# Patient Record
Sex: FEMALE | Race: BLACK OR AFRICAN AMERICAN
[De-identification: names, ages, dates, MRNs, and addresses within clinical notes are randomized per-mention and may not be internally consistent; named-entity substitution may affect disease eponyms.]

---

## 2018-12-30 ENCOUNTER — HOSPITAL ENCOUNTER (EMERGENCY)
Dept: HOSPITAL 35 - ER | Age: 23
Discharge: HOME | End: 2018-12-30
Payer: COMMERCIAL

## 2018-12-30 VITALS — BODY MASS INDEX: 19.83 KG/M2 | WEIGHT: 105.01 LBS | HEIGHT: 61 IN

## 2018-12-30 VITALS — DIASTOLIC BLOOD PRESSURE: 75 MMHG | SYSTOLIC BLOOD PRESSURE: 123 MMHG

## 2018-12-30 DIAGNOSIS — Z98.890: ICD-10-CM

## 2018-12-30 DIAGNOSIS — F17.210: ICD-10-CM

## 2018-12-30 DIAGNOSIS — R11.10: ICD-10-CM

## 2018-12-30 DIAGNOSIS — K59.00: Primary | ICD-10-CM

## 2018-12-30 LAB
ALBUMIN SERPL-MCNC: 3.1 G/DL (ref 3.4–5)
ALT SERPL-CCNC: 13 U/L (ref 30–65)
ANION GAP SERPL CALC-SCNC: 7 MMOL/L (ref 7–16)
AST SERPL-CCNC: 13 U/L (ref 15–37)
BASOPHILS NFR BLD AUTO: 0.1 % (ref 0–2)
BILIRUB SERPL-MCNC: 0.7 MG/DL
BILIRUB UR-MCNC: NEGATIVE MG/DL
BUN SERPL-MCNC: 11 MG/DL (ref 7–18)
CALCIUM SERPL-MCNC: 9.5 MG/DL (ref 8.5–10.1)
CHLORIDE SERPL-SCNC: 100 MMOL/L (ref 98–107)
CO2 SERPL-SCNC: 29 MMOL/L (ref 21–32)
COLOR UR: YELLOW
CREAT SERPL-MCNC: 0.8 MG/DL (ref 0.6–1)
EOSINOPHIL NFR BLD: 0.1 % (ref 0–3)
ERYTHROCYTE [DISTWIDTH] IN BLOOD BY AUTOMATED COUNT: 14.3 % (ref 10.5–14.5)
GLUCOSE SERPL-MCNC: 93 MG/DL (ref 74–106)
GRANULOCYTES NFR BLD MANUAL: 90.8 % (ref 36–66)
HCT VFR BLD CALC: 39 % (ref 37–47)
HGB BLD-MCNC: 12.8 GM/DL (ref 12–15)
KETONES UR STRIP-MCNC: (no result) MG/DL
LIPASE: 49 U/L (ref 73–393)
LYMPHOCYTES NFR BLD AUTO: 3.9 % (ref 24–44)
MCH RBC QN AUTO: 28.8 PG (ref 26–34)
MCHC RBC AUTO-ENTMCNC: 32.7 G/DL (ref 28–37)
MCV RBC: 87.9 FL (ref 80–100)
MONOCYTES NFR BLD: 5.1 % (ref 1–8)
NEUTROPHILS # BLD: 14.4 THOU/UL (ref 1.4–8.2)
PLATELET # BLD: 238 THOU/UL (ref 150–400)
POTASSIUM SERPL-SCNC: 3.5 MMOL/L (ref 3.5–5.1)
PROT SERPL-MCNC: 7.9 G/DL (ref 6.4–8.2)
RBC # BLD AUTO: 4.44 MIL/UL (ref 4.2–5)
RBC # UR STRIP: NEGATIVE /UL
SODIUM SERPL-SCNC: 136 MMOL/L (ref 136–145)
SP GR UR STRIP: >= 1.03 (ref 1–1.03)
URINE CLARITY: (no result)
URINE GLUCOSE-RANDOM*: NEGATIVE
URINE LEUKOCYTES-REFLEX: NEGATIVE
URINE NITRITE-REFLEX: NEGATIVE
URINE PROTEIN (DIPSTICK): (no result)
UROBILINOGEN UR STRIP-ACNC: 2 E.U./DL (ref 0.2–1)
WBC # BLD AUTO: 15.9 THOU/UL (ref 4–11)

## 2019-01-04 ENCOUNTER — HOSPITAL ENCOUNTER (INPATIENT)
Dept: HOSPITAL 35 - ER | Age: 24
LOS: 4 days | Discharge: HOME | DRG: 758 | End: 2019-01-08
Attending: INTERNAL MEDICINE | Admitting: INTERNAL MEDICINE
Payer: COMMERCIAL

## 2019-01-04 VITALS — SYSTOLIC BLOOD PRESSURE: 113 MMHG | DIASTOLIC BLOOD PRESSURE: 64 MMHG

## 2019-01-04 VITALS — SYSTOLIC BLOOD PRESSURE: 122 MMHG | DIASTOLIC BLOOD PRESSURE: 84 MMHG

## 2019-01-04 VITALS — SYSTOLIC BLOOD PRESSURE: 117 MMHG | DIASTOLIC BLOOD PRESSURE: 79 MMHG

## 2019-01-04 VITALS — DIASTOLIC BLOOD PRESSURE: 72 MMHG | SYSTOLIC BLOOD PRESSURE: 113 MMHG

## 2019-01-04 VITALS — WEIGHT: 110 LBS | HEIGHT: 60.98 IN | BODY MASS INDEX: 20.77 KG/M2

## 2019-01-04 DIAGNOSIS — F17.210: ICD-10-CM

## 2019-01-04 DIAGNOSIS — K52.9: ICD-10-CM

## 2019-01-04 DIAGNOSIS — Z98.891: ICD-10-CM

## 2019-01-04 DIAGNOSIS — F12.90: ICD-10-CM

## 2019-01-04 DIAGNOSIS — K58.0: ICD-10-CM

## 2019-01-04 DIAGNOSIS — K56.600: ICD-10-CM

## 2019-01-04 DIAGNOSIS — D64.9: ICD-10-CM

## 2019-01-04 DIAGNOSIS — Z79.899: ICD-10-CM

## 2019-01-04 DIAGNOSIS — A56.09: Primary | ICD-10-CM

## 2019-01-04 DIAGNOSIS — N94.6: ICD-10-CM

## 2019-01-04 DIAGNOSIS — N71.9: ICD-10-CM

## 2019-01-04 DIAGNOSIS — E87.6: ICD-10-CM

## 2019-01-04 LAB
ALBUMIN SERPL-MCNC: 2.7 G/DL (ref 3.4–5)
ALT SERPL-CCNC: 11 U/L (ref 30–65)
ANION GAP SERPL CALC-SCNC: 10 MMOL/L (ref 7–16)
AST SERPL-CCNC: 14 U/L (ref 15–37)
BASOPHILS NFR BLD AUTO: 0.2 % (ref 0–2)
BILIRUB SERPL-MCNC: 0.6 MG/DL
BILIRUB UR-MCNC: (no result) MG/DL
BUN SERPL-MCNC: 7 MG/DL (ref 7–18)
CALCIUM SERPL-MCNC: 9.9 MG/DL (ref 8.5–10.1)
CHLORIDE SERPL-SCNC: 95 MMOL/L (ref 98–107)
CO2 SERPL-SCNC: 32 MMOL/L (ref 21–32)
COLOR UR: (no result)
CREAT SERPL-MCNC: 0.8 MG/DL (ref 0.6–1)
EOSINOPHIL NFR BLD: 0.5 % (ref 0–3)
ERYTHROCYTE [DISTWIDTH] IN BLOOD BY AUTOMATED COUNT: 14.3 % (ref 10.5–14.5)
GLUCOSE SERPL-MCNC: 85 MG/DL (ref 74–106)
GRANULOCYTES NFR BLD MANUAL: 88.2 % (ref 36–66)
HCT VFR BLD CALC: 39.2 % (ref 37–47)
HGB BLD-MCNC: 13.4 GM/DL (ref 12–15)
KETONES UR STRIP-MCNC: (no result) MG/DL
LIPASE: 53 U/L (ref 73–393)
LYMPHOCYTES NFR BLD AUTO: 5.5 % (ref 24–44)
MCH RBC QN AUTO: 29.1 PG (ref 26–34)
MCHC RBC AUTO-ENTMCNC: 34 G/DL (ref 28–37)
MCV RBC: 85.6 FL (ref 80–100)
MONOCYTES NFR BLD: 5.6 % (ref 1–8)
MUCUS: (no result) STRN/LPF
NEUTROPHILS # BLD: 13.7 THOU/UL (ref 1.4–8.2)
PLATELET # BLD: 393 THOU/UL (ref 150–400)
POTASSIUM SERPL-SCNC: 3.1 MMOL/L (ref 3.5–5.1)
PROT SERPL-MCNC: 8.6 G/DL (ref 6.4–8.2)
RBC # BLD AUTO: 4.59 MIL/UL (ref 4.2–5)
RBC # UR STRIP: NEGATIVE /UL
SODIUM SERPL-SCNC: 137 MMOL/L (ref 136–145)
SP GR UR STRIP: 1.02 (ref 1–1.03)
SQUAMOUS: (no result) /LPF (ref 0–3)
URINE CLARITY: CLEAR
URINE GLUCOSE-RANDOM*: NEGATIVE
URINE LEUKOCYTES-REFLEX: NEGATIVE
URINE NITRITE-REFLEX: NEGATIVE
URINE PROTEIN (DIPSTICK): (no result)
URINE WBC-REFLEX: (no result) /HPF (ref 0–5)
UROBILINOGEN UR STRIP-ACNC: 1 E.U./DL (ref 0.2–1)
WBC # BLD AUTO: 15.5 THOU/UL (ref 4–11)

## 2019-01-04 PROCEDURE — 10084: CPT

## 2019-01-04 NOTE — HC
[facility]
[address] [place]   [address]                     CONSULTATION                  
_______________________________________________________________________________
 
Name:       SRI FERNANDEZ                Room #:         422-P       ADM IN  
M.R.#:      [id number]                       Account #:      [id number]  
Admission:  19    Attend Phys:    Elizabeth Resendiz MD        
Discharge:              Date of Birth:  [birth year]  
                                                          Report #: [id number]
                                                                    [id number]   
_______________________________________________________________________________
THIS REPORT FOR:   //name//                          
 
CC: JESSI physician/PCP
    Elizabeth Resendiz
 
DATE OF SERVICE:  2019
 
 
REASON FOR CONSULTATION:  Evaluate enteritis and chlamydia cervicitis.
 
HISTORY OF PRESENT ILLNESS:  The patient is a 23-year-old with no previous
medical history, who presents with approximately 2-week history of abdominal
pain located in her mid to lower abdomen.  This has been associated with initial
constipation.  Also associated with nausea and vomiting.  She has had
temperature up to 102 degrees.  There has been no diarrhea.  She had one day of
which she considers abnormal stool with possible blood associated.  No history
of rectal bleeding or passing mucus.  She lives with friends and her daughter,
none of which have been ill.  No travel.  Does not work outside the home. 
Admitted on 2019 and placed on ciprofloxacin and metronidazole.  Since
then, she has improved with less abdominal pain.  The nausea and vomiting have
resolved.  Fever has resolved.  She has had a bowel movement, but overall
remains constipated.
 
She does have a history of dysmenorrhea.  She delivered her child in  by
.  Her menses have been irregular.  She has been monogamous over the
last 4 months.  No prior history of STDs.  Reports HIV negative previously.  She
has had fairly persistent vaginal bleeding for the last 3 weeks.  She did have a
hormonal implant for birth control, which has  as of 2017.
 
ALLERGIES:  None known.
 
MEDICATIONS:  As noted on her MAR including ciprofloxacin, metronidazole and now
doxycycline, which was added today.
 
PAST MEDICAL HISTORY:  Congenital hearing loss, right ear.
 
FAMILY HISTORY:  Inflammatory bowel disease.
 
SOCIAL HISTORY:  Smoker of cigarettes, occasional marijuana.  No IV drug use. 
Minimal alcohol use.
 
REVIEW OF SYSTEMS:  Denies any headache, cough, sputum, chest pain,
palpitations, dysuria, rash, adenopathy, neurologic complaints, psychiatric
complaints, hematologic issues.
 
PHYSICAL EXAMINATION:
 
 
 
[facility]
[address] [place]   [address]                     CONSULTATION                  
_______________________________________________________________________________
 
Name:       SRI FERNANDEZ                Room #:         422-P       [facility] [place]  
M.R.#:      [id number]                       Account #:      [id number]  
Admission:  19    Attend Phys:    Elizabeth Resendiz MD        
Discharge:              Date of Birth:  [birth year]  
                                                          Report #: [id number]
                                                                    [id number]   
_______________________________________________________________________________
VITAL SIGNS:  Afebrile and hemodynamically stable.  Maximum temperature last 48
hours is 100.5 degrees.
SKIN:  Unremarkable other than multiple tattoos.
NECK:  No adenopathy palpable.
HEENT:  Eyes:  No icterus or conjunctivitis.  Mouth without lesion.  Several
piercings.
LUNGS:  Clear.
HEART:  Regular, without murmur.
ABDOMEN:  Soft with mild tenderness in the mid-to-lower abdomen.  No appreciable
mass or hepatosplenomegaly.
GENITAL AND RECTAL:  Not done as previously performed by Gynecology today.
EXTREMITIES:  Without lesion, swelling, cyanosis or clubbing.
NEUROLOGIC:  Cranial nerves intact.  Strength in the upper and lower extremities
normal.  Sensation normal.  Deep tendon reflexes normal.
PSYCHIATRIC:  Mood normal.
 
LABORATORY STUDIES:  Sodium 139, potassium 3.4, bicarbonate 31, creatinine 0.7. 
Lipase 53.  Liver function test normal.  Initial white count was 15,000, now
6.6.  Hemoglobin 10.3, platelet count 353,000.  Urinalysis:  2+ protein, 1+
ketones.  Chlamydia screen positive, GC negative, Trichomonas negative.  CT of
the abdomen and pelvis, thickening of the small bowel, several loops in the mid
abdomen.  Ultrasound of the pelvis negative.
 
IMPRESSION:  A 23-year-old with chlamydia cervicitis.  No evidence of pelvic
inflammatory disease in that her CT scan and ultrasound were negative for
abscess or uterine thickening and on examination by Gynecology, she had very
mild cervical motion tenderness.  Abdominal pain is concerning for inflammatory
bowel disease.  Still could be acute infectious process.  No evidence of trauma.
 Seems relatively young for malignancy.  Doubt vasculitis or ischemia.  Meckel's
diverticulum would be possible.  No evidence of appendicitis.
 
RECOMMENDATIONS:  We will continue antibiotic coverage with ciprofloxacin and
metronidazole for 10 days.  I agree with the addition of doxycycline for 7 days.
 We will switch to enteral route for her antibiotics and see if she can tolerate
this prior to discharge.  Small bowel follow through seems most appropriate next
test followed by colonoscopy.
 
 
 
 
 
 
 
 
                         
   By:                               
                   
D: 19 2226                           _____________________________________
T: 19 0202                           Yfn Adam MD              /nt

## 2019-01-05 VITALS — DIASTOLIC BLOOD PRESSURE: 78 MMHG | SYSTOLIC BLOOD PRESSURE: 122 MMHG

## 2019-01-05 VITALS — SYSTOLIC BLOOD PRESSURE: 115 MMHG | DIASTOLIC BLOOD PRESSURE: 70 MMHG

## 2019-01-05 VITALS — DIASTOLIC BLOOD PRESSURE: 74 MMHG | SYSTOLIC BLOOD PRESSURE: 119 MMHG

## 2019-01-05 VITALS — SYSTOLIC BLOOD PRESSURE: 127 MMHG | DIASTOLIC BLOOD PRESSURE: 77 MMHG

## 2019-01-06 VITALS — SYSTOLIC BLOOD PRESSURE: 129 MMHG | DIASTOLIC BLOOD PRESSURE: 74 MMHG

## 2019-01-06 VITALS — SYSTOLIC BLOOD PRESSURE: 149 MMHG | DIASTOLIC BLOOD PRESSURE: 84 MMHG

## 2019-01-06 VITALS — SYSTOLIC BLOOD PRESSURE: 135 MMHG | DIASTOLIC BLOOD PRESSURE: 86 MMHG

## 2019-01-06 LAB
ANION GAP SERPL CALC-SCNC: 6 MMOL/L (ref 7–16)
BUN SERPL-MCNC: 2 MG/DL (ref 7–18)
CALCIUM SERPL-MCNC: 8.6 MG/DL (ref 8.5–10.1)
CHLORIDE SERPL-SCNC: 100 MMOL/L (ref 98–107)
CO2 SERPL-SCNC: 31 MMOL/L (ref 21–32)
CREAT SERPL-MCNC: 0.7 MG/DL (ref 0.6–1)
ERYTHROCYTE [DISTWIDTH] IN BLOOD BY AUTOMATED COUNT: 14.4 % (ref 10.5–14.5)
GLUCOSE SERPL-MCNC: 88 MG/DL (ref 74–106)
HCT VFR BLD CALC: 31 % (ref 37–47)
HGB BLD-MCNC: 10.5 GM/DL (ref 12–15)
MCH RBC QN AUTO: 29.1 PG (ref 26–34)
MCHC RBC AUTO-ENTMCNC: 33.7 G/DL (ref 28–37)
MCV RBC: 86.4 FL (ref 80–100)
PLATELET # BLD: 350 THOU/UL (ref 150–400)
POTASSIUM SERPL-SCNC: 3.4 MMOL/L (ref 3.5–5.1)
RBC # BLD AUTO: 3.59 MIL/UL (ref 4.2–5)
SODIUM SERPL-SCNC: 137 MMOL/L (ref 136–145)
WBC # BLD AUTO: 8.7 THOU/UL (ref 4–11)

## 2019-01-06 NOTE — HC
[facility]
[address]
[place]   [address]                     CONSULTATION                  
_______________________________________________________________________________
 
Name:       SRI FERNANDEZ                Room #:         422-P       ADM [place]  
M.R.#:      [id number]                       Account #:      [id number]  
Admission:  19    Attend Phys:    Paul Markham MD    
Discharge:              Date of Birth:  [birth year]  
                                                          Report #: [id number]
                                                                    [id number]   
_______________________________________________________________________________
THIS REPORT FOR:   //name//                          
 
CC: Paul Markham
    [facility] physician/PCP
 
HISTORY OF PRESENT ILLNESS:  The patient is a 23-year-old 
female I have been asked to see for further evaluation of her GI symptoms, which
include abdominal pain, abdominal distention and diarrhea over the course of the
last 2 weeks, but worse over the last 1 week.  She denies hematochezia.  She has
had nausea and vomiting and presents with dilated small bowel loops.  She denies
significant fever or muscle aches, myalgias or general fatigue.
 
PAST MEDICAL HISTORY:  Her medical history is well detailed in her old notes but
includes no significant chronic medical problems.
 
PAST SURGICAL HISTORY:  She has had a  in the past.
 
FAMILY HISTORY:  Negative for inflammatory bowel disease or colon cancer.
 
ALLERGIES:  She is allergic to no medications.
 
MEDICATIONS:  No medications on presentation.
 
SOCIAL HISTORY:  She does smoke marijuana and cigarettes and drinks
occasionally.
 
REVIEW OF SYSTEMS:  Negative for weight loss, weakness or fatigue.  She denies
head, eyes, ears, nose or throat complaints.  She denies chest pain, chest
palpitation, chest pressure, cough, shortness of breath, wheezing,
genitourinary, musculoskeletal or neuropsychiatric complaints beyond that
mentioned above.
 
PHYSICAL EXAMINATION:
GENERAL:  The patient is afebrile.
VITAL SIGNS:  Stable.
HEENT:  Nonicteric.
NECK:  No JVD, thyromegaly or bruits.
CARDIOVASCULAR:  Regular.
LUNGS:  Clear.
ABDOMEN:  Soft, mildly distended, tender throughout without evidence of rebound.
 No stigmata of chronic liver disease.  No abnormal masses or bruits.
EXTREMITIES:  No clubbing, cyanosis or edema.
NEUROLOGIC:  Deferred.
RECTAL:  Deferred.
 
PERTINENT LABORATORY DATA:  White count 15.5 with a left shift.  Her sed rate
 
 
 
[facility]
[address] [place]   [address]                     CONSULTATION                  
_______________________________________________________________________________
 
Name:       ABHISHEK FERNANDEZLEY                Room #:         422-P       ADM [place]  
M.R.#:      [id number]                       Account #:      [id number]  
Admission:  19    Attend Phys:    Paul Markham MD    
Discharge:              Date of Birth:  [birth year]  
                                                          Report #: [id number]
                                                                    [id number]   
_______________________________________________________________________________
was 121.  Chemistry:  Potassium 3.1, chloride 95, venous bicarbonate 32.  ALT,
AST, lipase normal.  CT reveals proximal small bowel dilation and possible
colonic dilation.  There is apparent wall thickening of the bowel and possible
narrowed thick walled small bowel in the mid pelvis.  Differential included
inflammatory bowel disease versus enteritis.  She had a pelvic transvaginal
ultrasound, which revealed retroverted uterus and otherwise not abnormal.
 
IMPRESSION AND PLAN:  In summary, the patient has evidence of enteritis.  The
differential between infectious enteritis and inflammatory enteritis secondary
to Crohn's disease is often difficult to make.  She does have a sedimentation
rate significantly elevated.  Her clinical presentation is more consistent with
a more acute process, which would suggest a viral or bacterial enteritis.  She
has never had GI workup in the past.  At this point, I would facilitate
treatment with antibiotics as we are doing.  In addition, a colonoscopy may be
recommended at some point based on her clinical course.  IV fluids and relative
bowel rest is recommended as well.  We will follow concurrently.
 
Thanks again for allowing me to participate in the care of this woman.
 
 
 
 
 
 
 
 
 
 
 
 
 
 
 
 
 
 
 
 
 
 
 
 
 
 
  <ELECTRONICALLY SIGNED>
   By: Jorge Francis MD           
  19     1616
D: 19 1404                           _____________________________________
T: 19 1520                           Jorge Francis MD             /nt

## 2019-01-07 VITALS — DIASTOLIC BLOOD PRESSURE: 82 MMHG | SYSTOLIC BLOOD PRESSURE: 123 MMHG

## 2019-01-07 VITALS — DIASTOLIC BLOOD PRESSURE: 74 MMHG | SYSTOLIC BLOOD PRESSURE: 122 MMHG

## 2019-01-07 VITALS — DIASTOLIC BLOOD PRESSURE: 69 MMHG | SYSTOLIC BLOOD PRESSURE: 111 MMHG

## 2019-01-07 VITALS — SYSTOLIC BLOOD PRESSURE: 123 MMHG | DIASTOLIC BLOOD PRESSURE: 90 MMHG

## 2019-01-07 LAB
ANION GAP SERPL CALC-SCNC: 6 MMOL/L (ref 7–16)
BUN SERPL-MCNC: 3 MG/DL (ref 7–18)
CALCIUM SERPL-MCNC: 8.3 MG/DL (ref 8.5–10.1)
CHLORIDE SERPL-SCNC: 102 MMOL/L (ref 98–107)
CO2 SERPL-SCNC: 31 MMOL/L (ref 21–32)
CREAT SERPL-MCNC: 0.7 MG/DL (ref 0.6–1)
ERYTHROCYTE [DISTWIDTH] IN BLOOD BY AUTOMATED COUNT: 14.4 % (ref 10.5–14.5)
GLUCOSE SERPL-MCNC: 97 MG/DL (ref 74–106)
HCT VFR BLD CALC: 31.5 % (ref 37–47)
HGB BLD-MCNC: 10.3 GM/DL (ref 12–15)
MCH RBC QN AUTO: 28.4 PG (ref 26–34)
MCHC RBC AUTO-ENTMCNC: 32.7 G/DL (ref 28–37)
MCV RBC: 86.8 FL (ref 80–100)
PLATELET # BLD: 353 THOU/UL (ref 150–400)
POTASSIUM SERPL-SCNC: 3.4 MMOL/L (ref 3.5–5.1)
RBC # BLD AUTO: 3.63 MIL/UL (ref 4.2–5)
SODIUM SERPL-SCNC: 139 MMOL/L (ref 136–145)
WBC # BLD AUTO: 6.6 THOU/UL (ref 4–11)

## 2019-01-08 VITALS — DIASTOLIC BLOOD PRESSURE: 72 MMHG | SYSTOLIC BLOOD PRESSURE: 118 MMHG

## 2019-01-08 VITALS — SYSTOLIC BLOOD PRESSURE: 118 MMHG | DIASTOLIC BLOOD PRESSURE: 74 MMHG

## 2019-01-27 ENCOUNTER — HOSPITAL ENCOUNTER (EMERGENCY)
Dept: HOSPITAL 35 - ER | Age: 24
LOS: 1 days | Discharge: HOME | End: 2019-01-28
Payer: COMMERCIAL

## 2019-01-27 VITALS — HEIGHT: 61 IN | BODY MASS INDEX: 20.77 KG/M2 | WEIGHT: 110.01 LBS

## 2019-01-27 DIAGNOSIS — R10.84: ICD-10-CM

## 2019-01-27 DIAGNOSIS — K59.00: ICD-10-CM

## 2019-01-27 DIAGNOSIS — R11.2: Primary | ICD-10-CM

## 2019-01-27 DIAGNOSIS — F17.210: ICD-10-CM

## 2019-01-27 DIAGNOSIS — Z98.890: ICD-10-CM

## 2019-01-27 LAB
ALBUMIN SERPL-MCNC: 3.9 G/DL (ref 3.4–5)
ALT SERPL-CCNC: 16 U/L (ref 30–65)
ANION GAP SERPL CALC-SCNC: 13 MMOL/L (ref 7–16)
AST SERPL-CCNC: 15 U/L (ref 15–37)
BASOPHILS NFR BLD AUTO: 0.4 % (ref 0–2)
BILIRUB SERPL-MCNC: 1.4 MG/DL
BUN SERPL-MCNC: 15 MG/DL (ref 7–18)
CALCIUM SERPL-MCNC: 9.5 MG/DL (ref 8.5–10.1)
CHLORIDE SERPL-SCNC: 96 MMOL/L (ref 98–107)
CO2 SERPL-SCNC: 26 MMOL/L (ref 21–32)
CREAT SERPL-MCNC: 0.9 MG/DL (ref 0.6–1)
EOSINOPHIL NFR BLD: 0.1 % (ref 0–3)
ERYTHROCYTE [DISTWIDTH] IN BLOOD BY AUTOMATED COUNT: 15.4 % (ref 10.5–14.5)
GLUCOSE SERPL-MCNC: 77 MG/DL (ref 74–106)
GRANULOCYTES NFR BLD MANUAL: 83.9 % (ref 36–66)
HCT VFR BLD CALC: 40.3 % (ref 37–47)
HGB BLD-MCNC: 13.3 GM/DL (ref 12–15)
LIPASE: 66 U/L (ref 73–393)
LYMPHOCYTES NFR BLD AUTO: 10.9 % (ref 24–44)
MCH RBC QN AUTO: 28.3 PG (ref 26–34)
MCHC RBC AUTO-ENTMCNC: 33 G/DL (ref 28–37)
MCV RBC: 85.8 FL (ref 80–100)
MONOCYTES NFR BLD: 4.7 % (ref 1–8)
NEUTROPHILS # BLD: 7.2 THOU/UL (ref 1.4–8.2)
PLATELET # BLD: 219 THOU/UL (ref 150–400)
POTASSIUM SERPL-SCNC: 3.5 MMOL/L (ref 3.5–5.1)
PROT SERPL-MCNC: 8.6 G/DL (ref 6.4–8.2)
RBC # BLD AUTO: 4.7 MIL/UL (ref 4.2–5)
SODIUM SERPL-SCNC: 135 MMOL/L (ref 136–145)
WBC # BLD AUTO: 8.6 THOU/UL (ref 4–11)

## 2019-01-28 VITALS — DIASTOLIC BLOOD PRESSURE: 84 MMHG | SYSTOLIC BLOOD PRESSURE: 136 MMHG
